# Patient Record
Sex: MALE | Race: ASIAN | NOT HISPANIC OR LATINO | URBAN - METROPOLITAN AREA
[De-identification: names, ages, dates, MRNs, and addresses within clinical notes are randomized per-mention and may not be internally consistent; named-entity substitution may affect disease eponyms.]

---

## 2017-11-17 ENCOUNTER — EMERGENCY (EMERGENCY)
Facility: HOSPITAL | Age: 77
LOS: 1 days | Discharge: HOME | End: 2017-11-17
Attending: EMERGENCY MEDICINE | Admitting: EMERGENCY MEDICINE
Payer: MEDICARE

## 2017-11-17 VITALS
OXYGEN SATURATION: 98 % | TEMPERATURE: 98 F | RESPIRATION RATE: 18 BRPM | SYSTOLIC BLOOD PRESSURE: 133 MMHG | HEART RATE: 51 BPM | DIASTOLIC BLOOD PRESSURE: 70 MMHG
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
## 2017-11-17 DIAGNOSIS — S09.90XA UNSPECIFIED INJURY OF HEAD, INITIAL ENCOUNTER: ICD-10-CM
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
## 2017-11-17 DIAGNOSIS — Z88.8 ALLERGY STATUS TO OTHER DRUGS, MEDICAMENTS AND BIOLOGICAL SUBSTANCES STATUS: ICD-10-CM

## 2017-11-17 DIAGNOSIS — Z88.4 ALLERGY STATUS TO ANESTHETIC AGENT: ICD-10-CM

## 2017-11-17 PROCEDURE — 70450 CT HEAD/BRAIN W/O DYE: CPT | Mod: 26

## 2017-11-17 PROCEDURE — 99284 EMERGENCY DEPT VISIT MOD MDM: CPT

## 2017-11-17 NOTE — ED PROVIDER NOTE - OBJECTIVE STATEMENT
77y M with hx of hypotension, on baby ASA, allergy to aspirin, Motrin, Omniqpaque, presents to ED accompanied by wife c/o head pain. As per wife, pt had syncopal episode on 11/4/2017, +head trauma. Pt had staples in his head for a while, which were later removed. Pt presented to his cardiologist earlier today, where he had an echo done. Pt presents to ED from cardiologist's office for a head CT. Denies n/v

## 2017-11-17 NOTE — ED ADULT TRIAGE NOTE - CHIEF COMPLAINT QUOTE
Pt states he has a syncopal episode on nov 4th and hit head. Pt was just at the cardiologist and they sent him here to have another CT of head. Pt still has complaints of head pain. - n/v. Neuro intact. On baby ASA.

## 2017-11-17 NOTE — ED PROVIDER NOTE - MEDICAL DECISION MAKING DETAILS
Pt with head pain. Basic labs, trop, CT head ordered. Pt with head pain.  CT head ordered. No bleed or fracture.  Pituitary enlargement, outpatient MRI recommended by radiology.

## 2019-01-31 NOTE — ED PROVIDER NOTE - TIMING
[address] [facility]  [address] [facility]  [phone]    Cardiology Follow up    Patient:  Radha Benitez  :  1958  MRN:  [id number]    History of Present Illness:     71-year-old man with her neurologic symptoms which is thought to be idiopathic on a number neuropathy versus developing atypical parkinsonism 1st his multi-system atrophy, testicular cancer in the past, bilateral inguinal hernia repair, nephrolithiasis, BPH presents for new patient cardiology evaluation  He still has been getting shortness of breath and dizziness with exertion  He denies any chest pain, palpitations, or syncope  Patient Active Problem List   Diagnosis    Primary testicular cancer ([address] )    Dizziness    Plantar fasciitis, bilateral    Unspecified abnormalities of gait and mobility    Pain of right thumb    Pain of left thumb    Right elbow pain    Right wrist pain    Shortness of breath       Past Surgical History  Past Surgical History:   Procedure Laterality Date    COLONOSCOPY      HERNIA REPAIR      ,  inguinal hernia repair    KIDNEY STONE SURGERY      LITHOTRIPSY      Renal; Resolved: 07    ORCHIECTOMY Left     PA CYSTOURETHRO W/IMPLANT N/A 2018    Procedure: CYSTOSCOPY WITH INSERTION UROLIFT;  Surgeon: Rolf Bennett DO;  Location: [facility];  Service: Urology    PROSTATE SURGERY N/A 2018    Procedure: CYSTOSCOPY WITH INSERTION UROLIFT;  Surgeon: Rolf Bennett DO;  Location: [facility];  Service: Urology   [address] [place] [address]    For cancer    TONSILLECTOMY      URETERONEOCYSTOSTOMY      w/ cystoscopy Ureteral Stent Placement; Resolved: 2007    WISDOM TOOTH EXTRACTION         Social History   Social History     Social History    Marital status: /Civil Union     Spouse name: N/A    Number of children: N/A    Years of education: N/A     Occupational History    Not on file       Social History Main Topics    Smoking status: Former Smoker     Types: Cigarettes     Quit date: 1978    Smokeless tobacco: Never Used    Alcohol use Yes      Comment: social    Drug use: No    Sexual activity: No     Other Topics Concern    Not on file     Social History Narrative    No narrative on file        No Known Allergies    Family History   Family History   Problem Relation Age of Onset    Colon cancer Father     Heart failure Father     Parkinsonism Mother     Cancer Paternal Grandmother        Review of Systems:  Review of Systems   Constitutional: Negative for chills, fatigue and fever  HENT: Negative for hearing loss and trouble swallowing  Eyes: Negative for pain  Respiratory: Positive for shortness of breath  Negative for cough and chest tightness  Cardiovascular: Negative for chest pain, palpitations and leg swelling  Gastrointestinal: Negative for abdominal pain, blood in stool, nausea and vomiting  Endocrine: Negative for cold intolerance and heat intolerance  Genitourinary: Negative for difficulty urinating, frequency and hematuria  Musculoskeletal: Negative for arthralgias and neck pain  Skin: Negative for rash  Allergic/Immunologic: Negative for environmental allergies  Neurological: Positive for light-headedness  Negative for dizziness, weakness and headaches  Hematological: Does not bruise/bleed easily  Psychiatric/Behavioral: Negative for decreased concentration and sleep disturbance  The patient is not nervous/anxious            Current Outpatient Prescriptions:     calcium carbonate (TUMS) 500 mg chewable tablet, Chew 1 tablet as needed for indigestion or heartburn, Disp: , Rfl:     doxazosin (CARDURA) 1 mg tablet, Take 1 mg by mouth daily with dinner  , Disp: , Rfl:     dutasteride (AVODART) 0 5 mg capsule, Take 0 5 mg by mouth daily, Disp: , Rfl: 11    fludrocortisone (FLORINEF) 0 1 mg tablet, Take 1 tablet (0 1 mg total) by mouth daily (Patient taking differently: Take 0 05 mg by mouth daily  ), Disp: 30 tablet, Rfl: 3    meloxicam (MOBIC) 15 mg tablet, TAKE 1 TABLET BY MOUTH EVERY DAY (Patient taking differently: TAKE 1 TABLET BY MOUTH PRN), Disp: 30 tablet, Rfl: 1    multivitamin (THERAGRAN) TABS, Take 1 tablet by mouth daily, Disp: , Rfl:     psyllium (METAMUCIL) 0 52 g capsule, Take 0 52 g by mouth 2 (two) times a day, Disp: , Rfl:      Physical Exam:    There were no vitals filed for this visit  Physical Exam   Constitutional: He is oriented to person, place, and time  He appears well-developed and well-nourished  HENT:   Head: Normocephalic  Right Ear: External ear normal    Left Ear: External ear normal    Mouth/Throat: Oropharynx is clear and moist    Eyes: Pupils are equal, round, and reactive to light  Neck: No JVD present  Carotid bruit is not present  Cardiovascular: Normal rate, regular rhythm and intact distal pulses  Exam reveals no gallop and no friction rub  No murmur heard  Pulmonary/Chest: Effort normal and breath sounds normal  No tachypnea  No respiratory distress  He has no wheezes  He has no rales  He exhibits no tenderness  Abdominal: Soft  He exhibits no distension  There is no tenderness  There is no rebound and no guarding  Musculoskeletal: He exhibits no edema  Neurological: He is alert and oriented to person, place, and time  Skin: Skin is warm and dry  Psychiatric: He has a normal mood and affect  His behavior is normal  Judgment and thought content normal    Nursing note and vitals reviewed  Labs:not applicable    Assessment/Plan:    1  Orthostatic hypotension  I would like to discuss with Neurology to see if Sweet there would be a possibility  2  Shortness of breath-this may be secondary to his autonomic process but I would like him to follow up with pulmonology  3  Prevention of cardiovascular disease-would consider a calcium score for him in the future      I would like to see him back in 3-4 months for follow-up  Thank you so much, please do not hesitate to contact me with any questions or concerns          Maximiliano Tirado MD  1/31/2019  8:19 AM sudden onset

## 2019-11-25 NOTE — ED ADULT NURSE NOTE - NS ED NOTE  TALK SOMEONE YN
OT referral received, chart reviewed, and attempted to see patient for evaluation. Patient presently has a HGB of 6.2 and is hypotensive. Patient is now going to have a blood transfusion. Will defer and follow up later today or tomorrow for evaluation. No

## 2021-05-10 PROBLEM — I95.9 HYPOTENSION, UNSPECIFIED: Chronic | Status: ACTIVE | Noted: 2017-11-17

## 2021-06-23 ENCOUNTER — EMERGENCY (EMERGENCY)
Facility: HOSPITAL | Age: 81
LOS: 1 days | Discharge: HOME | End: 2021-06-23
Admitting: EMERGENCY MEDICINE
Payer: MEDICARE

## 2021-06-23 VITALS
DIASTOLIC BLOOD PRESSURE: 77 MMHG | SYSTOLIC BLOOD PRESSURE: 134 MMHG | WEIGHT: 156.53 LBS | HEIGHT: 65 IN | RESPIRATION RATE: 15 BRPM | OXYGEN SATURATION: 96 % | TEMPERATURE: 98 F | HEART RATE: 66 BPM

## 2021-06-23 DIAGNOSIS — Z88.6 ALLERGY STATUS TO ANALGESIC AGENT: ICD-10-CM
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
## 2021-06-23 DIAGNOSIS — S60.922A UNSPECIFIED SUPERFICIAL INJURY OF LEFT HAND, INITIAL ENCOUNTER: ICD-10-CM
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
## 2021-06-23 DIAGNOSIS — S60.222A CONTUSION OF LEFT HAND, INITIAL ENCOUNTER: ICD-10-CM

## 2021-06-23 DIAGNOSIS — I95.9 HYPOTENSION, UNSPECIFIED: ICD-10-CM
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
## 2021-06-23 PROCEDURE — 73130 X-RAY EXAM OF HAND: CPT | Mod: 26,LT

## 2021-06-23 PROCEDURE — 99283 EMERGENCY DEPT VISIT LOW MDM: CPT | Mod: 25

## 2021-06-23 RX ORDER — ACETAMINOPHEN 500 MG
650 TABLET ORAL ONCE
Refills: 0 | Status: COMPLETED | OUTPATIENT
Start: 2021-06-23 | End: 2021-06-23

## 2021-06-23 RX ADMIN — Medication 650 MILLIGRAM(S): at 16:42

## 2021-06-23 NOTE — ED PROVIDER NOTE - OBJECTIVE STATEMENT
Tylenol for pain.  500mg every 6-8hrs.     Follow up with a local dentist.     82 yo male c/o left hand injury sustained 2 weeks s/p mechanical fall s/p slip and fall. has been wearing wedding band to ring finger since and noticed swelling and left 4/5th knuckle area  with mild pain. denies numbness weakness. denies HA/LOC, neck pain, cp, sob, abd pain, back pain.

## 2021-06-23 NOTE — ED ADULT NURSE NOTE - NSIMPLEMENTINTERV_GEN_ALL_ED
Implemented All Universal Safety Interventions:  [place] to call system. Call bell, personal items and telephone within reach. Instruct patient to call for assistance. Room bathroom lighting operational. Non-slip footwear when patient is off stretcher. Physically safe environment: no spills, clutter or unnecessary equipment. Stretcher in lowest position, wheels locked, appropriate side rails in place.

## 2021-06-23 NOTE — ED PROVIDER NOTE - PROVIDER TOKENS
PROVIDER:[TOKEN:[[id number]]],PROVIDER:[TOKEN:[[id number]]],PROVIDER:[TOKEN:[[id number]]]

## 2021-06-23 NOTE — ED ADULT NURSE NOTE - OBJECTIVE STATEMENT
fell on 06/10/21 in apartment stairwell hitting head and left hand; still having pain and discomfort; minor swelling noted but unable to make a full fist; c/o stiffness

## 2021-06-23 NOTE — ED PROVIDER NOTE - PHYSICAL EXAMINATION
CONSTITUTIONAL: Well-appearing; well-nourished; in no apparent distress.   	HEAD: Normocephalic; atraumatic.   	EYES:  clear  	LUE: +mild swelling to 4th and 5th knuckle with ttp, skin intact, no sensory or motor deficits, good cap refill, soft compartments, dpi. no wrist tenderness or snuffbox tenderness. good ROM wrist.   	NEURO: A & O x 3; face symmetric; grossly unremarkable.   PSYCHOLOGICAL: The patient’s mood and manner are appropriate.

## 2021-06-23 NOTE — ED PROVIDER NOTE - NSFOLLOWUPINSTRUCTIONS_ED_ALL_ED_FT
Take Ibuprofen 600mg every 6-8 hours as needed for pain, take with food, and in addition you may take Tylenol 500 mg every 6-8 hours as needed for pain  Rest. Cool compresses.  Extremity elevation.  Ace wrap     Follow up with hand specialist within 2-3 days as needed    Return for any concerning or worsening symptoms.

## 2021-06-23 NOTE — ED PROVIDER NOTE - CARE PROVIDER_API CALL
Lanie Weinberg)  Plastic Surgery; Surgery  [address], [address]  [place] [address]  Phone: [phone]  Fax: [phone]  Follow Up Time:     Luis Warner)  Plastic Surgery; Surgery  [address] [place] [address]  Phone: [phone]  Fax: [phone]  Follow Up Time:     Valdemar Gonsales)  Plastic Surgery  [address], [address]  [place] [address]  Phone: [phone]  Fax: [phone]  Follow Up Time:

## 2021-06-23 NOTE — ED PROVIDER NOTE - PATIENT PORTAL LINK FT
You can access the FollowMyHealth Patient Portal offered by [facility] by registering at the following website: [email or web address]. By joining [employer]’s FollowMyHealth portal, you will also be able to view your health information using other applications (apps) compatible with our system.

## 2021-06-23 NOTE — ED PROVIDER NOTE - CLINICAL SUMMARY MEDICAL DECISION MAKING FREE TEXT BOX
left hand trauma 2 weeks ago, nvi, xrays prelim neg for fx or dislocation, most likely contusion, swelling most likely from wearing wedding band, advised to not wear rings/bracelets or any other objects on hand, supportive care, f/u hand prn, return precautions discussed. patient agrees with plan. dc home with spouse.

## 2021-06-23 NOTE — ED ADULT TRIAGE NOTE - CHIEF COMPLAINT QUOTE
patient walk in from home c/o left hand pain; fall on 06/10/21 in apartment building and hit head/left hand on staircase; minor swelling still present and c/o stiffness to left 4th and 5th digit when making a fist

## 2021-06-28 ENCOUNTER — NON-APPOINTMENT (OUTPATIENT)
Age: 81
End: 2021-06-28

## 2021-06-28 ENCOUNTER — APPOINTMENT (OUTPATIENT)
Dept: OPHTHALMOLOGY | Facility: CLINIC | Age: 81
End: 2021-06-28
Payer: MEDICARE

## 2021-06-28 PROCEDURE — 92136 OPHTHALMIC BIOMETRY: CPT

## 2021-06-28 PROCEDURE — 92250 FUNDUS PHOTOGRAPHY W/I&R: CPT

## 2021-06-28 PROCEDURE — 92025 CPTRIZED CORNEAL TOPOGRAPHY: CPT

## 2021-06-28 PROCEDURE — 92004 COMPRE OPH EXAM NEW PT 1/>: CPT

## 2023-03-27 PROBLEM — Z00.00 ENCOUNTER FOR PREVENTIVE HEALTH EXAMINATION: Status: ACTIVE | Noted: 2023-03-27

## 2023-04-03 ENCOUNTER — APPOINTMENT (OUTPATIENT)
Dept: OPHTHALMOLOGY | Facility: CLINIC | Age: 83
End: 2023-04-03

## 2025-05-01 NOTE — ED ADULT NURSE NOTE - CHPI ED NUR SYMPTOMS POS
Problem: ABCDS Injury Assessment  Goal: Absence of physical injury  Outcome: Progressing     Problem: Nutrition Deficit:  Goal: Optimize nutritional status  5/1/2025 0138 by Kate Butler RN  Outcome: Progressing  4/30/2025 1506 by Allie West, RD, LD  Flowsheets (Taken 4/30/2025 1506)  Nutrient intake appropriate for improving, restoring, or maintaining nutritional needs:   Assess nutritional status and recommend course of action   Monitor oral intake, labs, and treatment plans   Recommend appropriate diets, oral nutritional supplements, and vitamin/mineral supplements     Problem: Safety - Adult  Goal: Free from fall injury  Outcome: Progressing     Problem: Discharge Planning  Goal: Discharge to home or other facility with appropriate resources  Outcome: Progressing     Problem: Neurosensory - Adult  Goal: Absence of seizures  Outcome: Progressing     Problem: Respiratory - Adult  Goal: Achieves optimal ventilation and oxygenation  Outcome: Progressing     Problem: Cardiovascular - Adult  Goal: Maintains optimal cardiac output and hemodynamic stability  Outcome: Progressing     Problem: Skin/Tissue Integrity - Adult  Goal: Incisions, wounds, or drain sites healing without S/S of infection  Outcome: Progressing     Problem: Musculoskeletal - Adult  Goal: Return mobility to safest level of function  Outcome: Progressing     Problem: Gastrointestinal - Adult  Goal: Maintains or returns to baseline bowel function  Outcome: Progressing     Problem: Genitourinary - Adult  Goal: Absence of urinary retention  Outcome: Progressing     Problem: Infection - Adult  Goal: Absence of infection at discharge  Outcome: Progressing     Problem: Metabolic/Fluid and Electrolytes - Adult  Goal: Electrolytes maintained within normal limits  Outcome: Progressing      INFLAMMATION/JOINT SWELLING/PAIN/STIFFNESS

## 2025-07-06 ENCOUNTER — EMERGENCY (EMERGENCY)
Facility: HOSPITAL | Age: 85
LOS: 1 days | End: 2025-07-06
Attending: EMERGENCY MEDICINE | Admitting: EMERGENCY MEDICINE
Payer: MEDICARE

## 2025-07-06 VITALS
OXYGEN SATURATION: 95 % | SYSTOLIC BLOOD PRESSURE: 136 MMHG | WEIGHT: 134.04 LBS | DIASTOLIC BLOOD PRESSURE: 76 MMHG | HEART RATE: 64 BPM | TEMPERATURE: 98 F | RESPIRATION RATE: 18 BRPM

## 2025-07-06 PROCEDURE — 99284 EMERGENCY DEPT VISIT MOD MDM: CPT

## 2025-07-06 PROCEDURE — 73140 X-RAY EXAM OF FINGER(S): CPT | Mod: 26,RT

## 2025-07-06 RX ORDER — CEPHALEXIN 250 MG/1
500 CAPSULE ORAL ONCE
Refills: 0 | Status: COMPLETED | OUTPATIENT
Start: 2025-07-06 | End: 2025-07-06

## 2025-07-06 RX ORDER — CEPHALEXIN 250 MG/1
1 CAPSULE ORAL
Qty: 28 | Refills: 0
Start: 2025-07-06 | End: 2025-07-12

## 2025-07-06 RX ADMIN — CEPHALEXIN 500 MILLIGRAM(S): 250 CAPSULE ORAL at 23:40

## 2025-07-06 NOTE — ED PROVIDER NOTE - CLINICAL SUMMARY MEDICAL DECISION MAKING FREE TEXT BOX
Patient presents to the ED with complaints of right thumb laceration last night when cutting vegetables, tip of right thumb cut off including the nail. Unknown last TDAP.   He was slicing fennel on a mandolin.  5mm segment of tip of thumb avulsed. Patient presents to the ED with complaints of right thumb laceration last night when cutting vegetables, tip of right thumb cut off including the nail. Unknown last TDAP.   He was slicing fennel on a mandolin.  5mm segment of tip of thumb avulsed.    Wound cleansed with copious irrigation, dressed with Xeroform, XR neg, Keflex and Adacel.

## 2025-07-06 NOTE — ED ADULT TRIAGE NOTE - CHIEF COMPLAINT QUOTE
Patient presents to the ED with complaints of right thumb laceration last night when cutting vegetables, tip of right thumb cut off including the nail. Unknown last TDAP.

## 2025-07-06 NOTE — ED PROVIDER NOTE - NSFOLLOWUPINSTRUCTIONS_ED_ALL_ED_FT
Nail Avulsion  Nail avulsion is when a nail tears away from the nail bed due to an accident or injury. Nail avulsion can be painful. Your finger or toe may bleed a lot, and you may have some pain, redness, throbbing, and swelling while it heals.    Your nail will grow back within several months. Once it grows back, it might not look the same as the old nail. This may happen even after taking good care of it.    Follow these instructions at home:  Wound care    Follow instructions from your health care provider about how to take care of your wound. Make sure you:  Wash your hands with soap and water for at least 20 seconds before and after you change your bandage (dressing). If soap and water are not available, use hand .  Change your dressing as told by your provider.  If you have them, leave stitches (sutures), skin glue, or tape strips in place. These skin closures may need to stay in place for 2 weeks or longer. If tape strip edges start to loosen and curl up, you may trim the loose edges. Do not remove tape strips completely unless your provider tells you to do that.  Check your wound every day for signs of infection. Check for:  Redness, swelling, or pain.  Fluid or blood.  Warmth.  Pus or a bad smell.  If you have bleeding, press gently on the nail bed with a gauze pad for 15 minutes.  Keep the wound dry for 48 hours.  Cover your wound with a watertight covering when you take a shower.  After 48 hours have passed, lightly wash the finger or toe in warm, soapy water 2–3 times a day. This helps to reduce pain and swelling. It also prevents infection.  Do not take baths, swim, or use a hot tub until your provider approves.  Medicine    Take over-the-counter and prescription medicines only as told by your provider.  Talk with your provider before taking aspirin or NSAIDs. These medicines can raise your risk of bleeding.  If you were prescribed antibiotics, take them as told by your provider. Do not stop using the antibiotic even if you start to feel better.  General instructions    An injured foot wrapped in a bandage and elevated.  Raise (elevate) the injured area above the level of your heart while you are sitting or lying down. This helps to reduce pain and swelling. Do this as much possible for the first 48 hours after the injury.  Move the toe or finger often to avoid stiffness.  Do not use any products that contain nicotine or tobacco. These products include cigarettes, chewing tobacco, and vaping devices, such as e-cigarettes. If you need help quitting, ask your provider.  Contact a health care provider if:  You have signs of infection around your wound.  You have bleeding that does not stop, even when you apply pressure to the wound.  You have a temperature that is higher than 100.4°F (38°C).  The affected finger or toe looks white or black.

## 2025-07-06 NOTE — ED PROVIDER NOTE - OBJECTIVE STATEMENT
Patient presents to the ED with complaints of right thumb laceration last night when cutting vegetables, tip of right thumb cut off including the nail. Unknown last TDAP.   He was slicing fennel on a mandolin.  5mm segment of tip of thumb avulsed.

## 2025-07-06 NOTE — ED PROVIDER NOTE - PATIENT PORTAL LINK FT
You can access the FollowMyHealth Patient Portal offered by [facility] by registering at the following website: [email or web address]. By joining [employer]’s FollowMyHealth portal, you will also be able to view your health information using other applications (apps) compatible with our system.

## 2025-07-10 DIAGNOSIS — S61.011A LACERATION WITHOUT FOREIGN BODY OF RIGHT THUMB WITHOUT DAMAGE TO NAIL, INITIAL ENCOUNTER: ICD-10-CM

## 2025-07-10 DIAGNOSIS — Z88.6 ALLERGY STATUS TO ANALGESIC AGENT: ICD-10-CM
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
## 2025-07-10 DIAGNOSIS — Z88.8 ALLERGY STATUS TO OTHER DRUGS, MEDICAMENTS AND BIOLOGICAL SUBSTANCES: ICD-10-CM
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
## 2025-07-10 DIAGNOSIS — Z23 ENCOUNTER FOR IMMUNIZATION: ICD-10-CM
